# Patient Record
Sex: FEMALE | URBAN - METROPOLITAN AREA
[De-identification: names, ages, dates, MRNs, and addresses within clinical notes are randomized per-mention and may not be internally consistent; named-entity substitution may affect disease eponyms.]

---

## 2024-11-05 ENCOUNTER — CLINICAL DOCUMENTATION (OUTPATIENT)
Age: 34
End: 2024-11-05

## 2024-11-05 NOTE — PROGRESS NOTES
11/5/2024 1444: New pt referral received. Referred by Joni Roach MD at [facility]. Dx: Hepatomegaly. Routine appt